# Patient Record
Sex: MALE | Race: WHITE | URBAN - METROPOLITAN AREA
[De-identification: names, ages, dates, MRNs, and addresses within clinical notes are randomized per-mention and may not be internally consistent; named-entity substitution may affect disease eponyms.]

---

## 2022-03-04 ENCOUNTER — OFFICE VISIT (OUTPATIENT)
Dept: ENT CLINIC | Age: 42
End: 2022-03-04
Payer: COMMERCIAL

## 2022-03-04 VITALS
HEART RATE: 94 BPM | HEIGHT: 72 IN | WEIGHT: 259.4 LBS | TEMPERATURE: 97.5 F | SYSTOLIC BLOOD PRESSURE: 138 MMHG | DIASTOLIC BLOOD PRESSURE: 83 MMHG | BODY MASS INDEX: 35.13 KG/M2

## 2022-03-04 DIAGNOSIS — J38.3 LESION OF TRUE VOCAL CORD: ICD-10-CM

## 2022-03-04 DIAGNOSIS — R49.0 DYSPHONIA: Primary | ICD-10-CM

## 2022-03-04 PROCEDURE — G8419 CALC BMI OUT NRM PARAM NOF/U: HCPCS | Performed by: OTOLARYNGOLOGY

## 2022-03-04 PROCEDURE — 99203 OFFICE O/P NEW LOW 30 MIN: CPT | Performed by: OTOLARYNGOLOGY

## 2022-03-04 PROCEDURE — 31575 DIAGNOSTIC LARYNGOSCOPY: CPT | Performed by: OTOLARYNGOLOGY

## 2022-03-04 PROCEDURE — G8484 FLU IMMUNIZE NO ADMIN: HCPCS | Performed by: OTOLARYNGOLOGY

## 2022-03-04 PROCEDURE — 4004F PT TOBACCO SCREEN RCVD TLK: CPT | Performed by: OTOLARYNGOLOGY

## 2022-03-04 PROCEDURE — G8427 DOCREV CUR MEDS BY ELIG CLIN: HCPCS | Performed by: OTOLARYNGOLOGY

## 2022-03-04 ASSESSMENT — ENCOUNTER SYMPTOMS
VOICE CHANGE: 1
EYE ITCHING: 0
SHORTNESS OF BREATH: 0
FACIAL SWELLING: 0
APNEA: 0
SORE THROAT: 0
COUGH: 0
TROUBLE SWALLOWING: 0
SINUS PRESSURE: 0

## 2022-03-04 NOTE — PROGRESS NOTES
Karyn Carty 89, [address], [address] [place] [address]  P: [phone]       Patient     Tish Dawn  1980    ChiefComplaint     Chief Complaint   Patient presents with   Mahamed Rosario since seeing PCP his voice has gotten better. Has been going on for about a month and 2 weeks. History of Present Illness     Lan Benjamin is a 21-year-old male here today for evaluation of dysphonia. Reports proximately 1 month ago after yelling at [facility] sudden change in his voice. Reports difficulty with projection and significant roughness. Several days ago felt something dislodged and his voice improved approximately 80%. Reports daily smoking. No daily alcohol use. 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 Past Medical History     Past Medical History:   Diagnosis Date    Back pain     Hypertension     Kidney stone        Past Surgical History     History reviewed. No pertinent surgical history. Family History     History reviewed. No pertinent family history. Social History     Social History     Tobacco Use    Smoking status: Current Every Day Smoker     Packs/day: 0.50     Types: Cigarettes    Smokeless tobacco: Former User     Quit date: 02/2022    Tobacco comment: Vaping since June   Vaping Use    Vaping Use: Former    Quit date: 2/4/2022   Substance Use Topics    Alcohol use: No    Drug use: No        Allergies     Allergies   Allergen Reactions    Penicillins Anaphylaxis       Medications     No current outpatient medications on file. No current facility-administered medications for this visit. Review of Systems     Review of Systems   Constitutional: Negative for appetite change, chills, fatigue, fever and unexpected weight change. HENT: Positive for voice change.  Negative for congestion, ear discharge, ear pain, facial swelling, hearing loss, nosebleeds, postnasal drip, sinus pressure, sneezing, sore throat, tinnitus and trouble swallowing. Eyes: Negative for itching. Respiratory: Negative for apnea, cough and shortness of breath. Endocrine: Negative for cold intolerance and heat intolerance. Musculoskeletal: Negative for myalgias and neck pain. Skin: Negative for rash. Allergic/Immunologic: Negative for environmental allergies. Neurological: Negative for dizziness and headaches. Psychiatric/Behavioral: Negative for confusion, decreased concentration and sleep disturbance. PhysicalExam     Vitals:    03/04/22 1003   BP: 138/83   Site: Left Upper Arm   Position: Sitting   Cuff Size: Large Adult   Pulse: 94   Temp: 97.5 °F (36.4 °C)   TempSrc: Infrared   Weight: 259 lb 6.4 oz (117.7 kg)   Height: 6' (1.829 m)       Physical Exam  Constitutional:       General: He is not in acute distress. Appearance: He is well-developed. HENT:      Head: Normocephalic and atraumatic. Right Ear: Tympanic membrane, ear canal and external ear normal. No drainage. No middle ear effusion. Tympanic membrane is not bulging. Tympanic membrane has normal mobility. Left Ear: Tympanic membrane, ear canal and external ear normal. No drainage. No middle ear effusion. Tympanic membrane is not bulging. Tympanic membrane has normal mobility. Nose: No mucosal edema or rhinorrhea. Mouth/Throat:      Lips: Pink. Mouth: Mucous membranes are moist.      Tongue: No lesions. Palate: No mass. Pharynx: Uvula midline. Eyes:      Pupils: Pupils are equal, round, and reactive to light. Neck:      Thyroid: No thyroid mass or thyromegaly. Trachea: Trachea and phonation normal.   Cardiovascular:      Pulses: Normal pulses. Pulmonary:      Effort: Pulmonary effort is normal. No accessory muscle usage or respiratory distress. Breath sounds: No stridor. Musculoskeletal:      Cervical back: Full passive range of motion without pain.    Lymphadenopathy:      Head: Right side of head: No submental or submandibular adenopathy. Left side of head: No submental or submandibular adenopathy. Cervical: No cervical adenopathy. Right cervical: No superficial, deep or posterior cervical adenopathy. Left cervical: No superficial, deep or posterior cervical adenopathy. Skin:     General: Skin is warm and dry. Neurological:      Mental Status: He is alert and oriented to person, place, and time. Cranial Nerves: No cranial nerve deficit. Coordination: Coordination normal.      Gait: Gait normal.   Psychiatric:         Thought Content: Thought content normal.           Procedure     Flexible Laryngoscopy    Pre op: dysphonia  Post op: same, vocal cord lesion  Procedure : Flexible Laryngoscopy  Surgeon: Ashley Dowling DO  Anesthesia: Afrin with 4% lidocaine  Indication: Laryngeal mirror examination was not tolerated due to gag reflex  Description:  The scope was passed along the floor of the left naris to the level of the larynx. The base of tongue, vallecula, epiglottis, aryepiglottic folds, arytenoids, false vocal folds, true vocal folds, or pyriform sinuses were all evaluated. True vocal folds exhibited symmetric motion bilaterally without evidence of paralysis or paresis. The scope was removed. The patient tolerated the procedure without difficulty. There were no complications. Pertinent findings: Irregularity at junction of posterior and mid one third right true vocal cord-plicae versus recent site of polyp      Assessment and Plan     1. Dysphonia    2. Lesion of true vocal cord    - Reina Mccray, [facility], SLP - Speech [facility]      80% improvement in vocal quality with irregularity of right true vocal cord. Follow-up with SLP for videostrobe for better assessment. May be secondary to recent trauma versus leukoplakia. Ashley Dowling DO  3/4/22      Portions of this note were dictated using Dragon.  There may be linguistic errors secondary to the use of this program.